# Patient Record
(demographics unavailable — no encounter records)

---

## 2025-04-28 NOTE — PHYSICAL EXAM
[Well Developed] : well developed [NAD] : in no acute distress [PERRL] : pupils were equal, round, reactive to light  [Moist & Pink Mucous Membranes] : moist and pink mucous membranes [CTAB] : lungs clear to auscultation bilaterally [Regular Rate and Rhythm] : regular rate and rhythm [Normal S1, S2] : normal S1 and S2 [Soft] : soft  [Normal Bowel Sounds] : normal bowel sounds [No HSM] : no hepatosplenomegaly appreciated [Normal Tone] : normal tone [Well-Perfused] : well-perfused [Interactive] : interactive [icteric] : anicteric [Respiratory Distress] : no respiratory distress  [Distended] : non distended [Tender] : non tender [Normal rectal exam] : exam was normal [Edema] : no edema [Cyanosis] : no cyanosis [Rash] : no rash [Jaundice] : no jaundice

## 2025-04-28 NOTE — REASON FOR VISIT
[Consultation] : a consultation visit [Patient] : patient [Mother] : mother [Father] : father [Other: ______] : provided by CHERY [Interpreters_IDNumber] : [id number] [Interpreters_FullName] : Butch [TWNoteComboBox1] : Kazakh

## 2025-04-28 NOTE — ASSESSMENT
[FreeTextEntry1] : 10 year old male with abdominal pain and nausea which comes and goes and has been severe enough over the past few weeks necessitating an ED visit in March 2025. Differential diagnosis is broad and includes but is not limited to MARBELLA, GERD, peptic disease, allergy, infection, autoimmune, systemic or inflammatory disease as well as intolerance and disorders of gut brain interaction.    Plan: lifestyle changes stop skipping meals MARBELLA precautions stool for H pylori TUMS prn abd pain f/u appt with MAGALY Abbott in 1 month with event diary if symptoms continue, consider further testing with lab assessment at that time including celiac screen family expressed understanding and was in agreement with plan

## 2025-04-28 NOTE — HISTORY OF PRESENT ILLNESS
[de-identified] : 10 year old male with abdominal pain Has been experiencing intermittent abdominal pain for years. Difficulty with abdominal pain which comes and goes. In March 2025 brought to ED at [facility] In October 2021 had a normal abd sonogram. Told of possible bacteria for which he was empirically treated in 2023.  Nausea and abd pain comes and goes.  No vomiting. No fever. No ill contacts.  BMs occur every other day, [place]. Pain worse with spicy food.  Diet Hx obtained Lives with mother Father lives separately

## 2025-04-28 NOTE — CONSULT LETTER
[Dear  ___] : Dear  [unfilled], [Consult Letter:] : I had the pleasure of evaluating your patient, [unfilled]. [Please see my note below.] : Please see my note below. [Consult Closing:] : Thank you very much for allowing me to participate in the care of this patient.  If you have any questions, please do not hesitate to contact me. [Sincerely,] : Sincerely, [FreeTextEntry3] : Ira Jay MD Division of Pediatric Gastroenterology [facility]

## 2025-06-09 NOTE — REASON FOR VISIT
[Consultation Follow Up] : a consultation follow up  [Mother] : mother [Medical Records] : medical records [Language Line ] : provided by Language Line   [Interpreters_IDNumber] : [id number] [Interpreters_FullName] : Jamaal [TWNoteComboBox1] : Fijian

## 2025-06-09 NOTE — PHYSICAL EXAM
[Well Developed] : well developed [NAD] : in no acute distress [PERRL] : pupils were equal, round, reactive to light  [Moist & Pink Mucous Membranes] : moist and pink mucous membranes [CTAB] : lungs clear to auscultation bilaterally [Regular Rate and Rhythm] : regular rate and rhythm [Soft] : soft  [Normal S1, S2] : normal S1 and S2 [Normal Bowel Sounds] : normal bowel sounds [No HSM] : no hepatosplenomegaly appreciated [Well-Perfused] : well-perfused [Normal Tone] : normal tone [Interactive] : interactive [icteric] : anicteric [Respiratory Distress] : no respiratory distress  [Distended] : non distended [Tender] : non tender [Edema] : no edema [Cyanosis] : no cyanosis [Jaundice] : no jaundice [Rash] : no rash

## 2025-06-09 NOTE — END OF VISIT
[FreeTextEntry3] :  I, Dr. Jay personally performed the evaluation and management (E/M) services for this established patient who presents today for follow up. That E/M includes conducting the clinically appropriate interval history &/or exam, assessing all new/exacerbated conditions, and establishing a new plan of care. Today, my ROMULO, Katelynn Abbott was here to observe my evaluation and management service for this new problem/exacerbated condition and follow the plan of care established by me going forward.  [Time Spent: ___ minutes] : I have spent [unfilled] minutes of time on the encounter which excludes teaching and separately reported services.

## 2025-06-09 NOTE — ASSESSMENT
[Educated Patient & Family about Diagnosis] : educated the patient and family about the diagnosis [FreeTextEntry1] : 10-year-old male with abdominal pain and nausea who underwent EGD on 5/29/25 for symptoms of abdominal pain, nausea and H pylori infection. Biopsies confirmed diagnosis of H-Pylori infection he is here today for follow up and to discuss treatment plan. Discussed importance of completing full course of antibiotics to minimize the risk of drug resistance, ensure eradication of the bacteria and minimize the risk of long-term complications if left untreated.   Plan:  -Initiate treatment with Bismuth, PPI, Amoxicillin and Metronidazole for 2 weeks. -Stool for H-Pylori in 6-8 weeks to ensure eradication. -Continue MARBELLA precautions -Follow up with NP in 6-8 weeks in [place]. -Call sooner with questions, concerns or change in clinical status.

## 2025-06-09 NOTE — HISTORY OF PRESENT ILLNESS
[de-identified] : Willie is a 10-year-old male initially evaluated by Dr Jay for abdominal pain and nausea here today for follow up. Since last visit underwent EGD on May 29, 2025.  Reviewed EGD with biopsies findings from 5/29/25, which shows mild antral erythema and confirmed diagnosis of H-Pylori. No allergies and able to swallow pills. Willie continues with ongoing epigastric pain every few days. No vomiting but feeling nauseous every day. Despite frequent complaints of abdominal pain, able to go to school and engage in activities he enjoys. Stooling daily, [place] 3, no mucus or blood. Eating a regular diet, fruits and minimal vegetables. No daily medications. Good UO. Gaining weight and growing well.  History: Has been experiencing intermittent abdominal pain for years. Difficulty with abdominal pain which comes and goes. In March 2025 brought to ED at [facility] In October 2021 had a normal abd sonogram. Told of possible bacteria for which he was empirically treated in 2023.

## 2025-06-09 NOTE — CONSULT LETTER
[Consult Letter:] : I had the pleasure of evaluating your patient, [unfilled]. [Dear  ___] : Dear  [unfilled], [Please see my note below.] : Please see my note below. [Consult Closing:] : Thank you very much for allowing me to participate in the care of this patient.  If you have any questions, please do not hesitate to contact me. [Sincerely,] : Sincerely, [FreeTextEntry3] : Katelynn Abbott, RN, MSN, CPNP Certified Pediatric Nurse Practitioner   Ira Jay MD Division of Pediatric Gastroenterology [facility]